# Patient Record
Sex: MALE | Race: WHITE | NOT HISPANIC OR LATINO | Employment: FULL TIME | URBAN - METROPOLITAN AREA
[De-identification: names, ages, dates, MRNs, and addresses within clinical notes are randomized per-mention and may not be internally consistent; named-entity substitution may affect disease eponyms.]

---

## 2019-06-25 ENCOUNTER — APPOINTMENT (OUTPATIENT)
Dept: MEDICAL GROUP | Facility: PHYSICIAN GROUP | Age: 64
End: 2019-06-25
Payer: MEDICARE

## 2019-06-25 ENCOUNTER — TELEPHONE (OUTPATIENT)
Dept: MEDICAL GROUP | Facility: PHYSICIAN GROUP | Age: 64
End: 2019-06-25

## 2019-06-25 NOTE — TELEPHONE ENCOUNTER
Future Appointments       Provider Department Center    6/26/2019 10:30 AM Lana Jerry D.O. [facility]        NEW PATIENT VISIT PRE-VISIT PLANNING    1.  E[employer] Patient is checked in Patient Demographics? YES    2.  Immunizations were updated in [facility] using WebIZ?: Yes       •  Web Iz Recommendations: FLU, PREVNAR (PCV13) , TD, VARICELLA (Chicken Pox)  and SHINGRIX (Shingles)    3.  Is this appointment scheduled as a Hospital Follow-Up? No    4.  Patient is due for the following Health Maintenance Topics:   Health Maintenance Due   Topic Date Due   • HEPATITIS C SCREENING  1955   • COLONOSCOPY  06/12/2004   • IMM ZOSTER VACCINES (1 of 2) 06/12/2004   • IMM PNEUMOCOCCAL  (1 of 2 - PCV13) 06/12/2019       5. Orders for overdue Health Maintenance topics pended in Pre-Charting? YES    6.  Reviewed/Updated the following with patient:   •   Preferred Pharmacy? YES       •   Preferred Lab? YES       •   Preferred Communication? YES       •   Allergies? YES       •   Medications? NO       •   Social History? YES. Was Abstract Encounter opened and chart updated? YES       •   Family History (document living status of immediate family members and if + hx of cancer, diabetes, hypertension, hyperlipidemia, heart attack, stroke) YES. Was Abstract Encounter opened and chart updated? YES    7.  Updated Care Team?       •   DME Company (gait device, O2, CPAP, etc.) YES       •   Other Specialists (eye doctor, derm, GYN, cardiology, endo, etc): YES    8.  Patient was informed to arrive 15 min prior to their   scheduled appointment and bring in their medication bottles.

## 2019-06-26 ENCOUNTER — OFFICE VISIT (OUTPATIENT)
Dept: MEDICAL GROUP | Facility: PHYSICIAN GROUP | Age: 64
End: 2019-06-26
Payer: MEDICARE

## 2019-06-26 ENCOUNTER — APPOINTMENT (OUTPATIENT)
Dept: RADIOLOGY | Facility: IMAGING CENTER | Age: 64
End: 2019-06-26
Attending: FAMILY MEDICINE
Payer: MEDICARE

## 2019-06-26 ENCOUNTER — TELEPHONE (OUTPATIENT)
Dept: MEDICAL GROUP | Facility: PHYSICIAN GROUP | Age: 64
End: 2019-06-26

## 2019-06-26 ENCOUNTER — HOSPITAL ENCOUNTER (OUTPATIENT)
Dept: LAB | Facility: MEDICAL CENTER | Age: 64
End: 2019-06-26
Attending: FAMILY MEDICINE
Payer: MEDICARE

## 2019-06-26 VITALS
WEIGHT: 179 LBS | HEART RATE: 70 BPM | DIASTOLIC BLOOD PRESSURE: 86 MMHG | TEMPERATURE: 98.3 F | BODY MASS INDEX: 23.72 KG/M2 | SYSTOLIC BLOOD PRESSURE: 126 MMHG | HEIGHT: 73 IN | OXYGEN SATURATION: 99 %

## 2019-06-26 DIAGNOSIS — M25.562 CHRONIC PAIN OF LEFT KNEE: ICD-10-CM

## 2019-06-26 DIAGNOSIS — F17.200 SMOKER: ICD-10-CM

## 2019-06-26 DIAGNOSIS — Z00.00 PREVENTATIVE HEALTH CARE: ICD-10-CM

## 2019-06-26 DIAGNOSIS — G89.29 CHRONIC PAIN OF LEFT KNEE: ICD-10-CM

## 2019-06-26 DIAGNOSIS — E78.49 OTHER HYPERLIPIDEMIA: ICD-10-CM

## 2019-06-26 DIAGNOSIS — Z11.59 NEED FOR HEPATITIS C SCREENING TEST: ICD-10-CM

## 2019-06-26 DIAGNOSIS — Z87.81 HISTORY OF FRACTURED KNEECAP: ICD-10-CM

## 2019-06-26 LAB
ALBUMIN SERPL BCP-MCNC: 4.2 G/DL (ref 3.2–4.9)
ALBUMIN/GLOB SERPL: 1.8 G/DL
ALP SERPL-CCNC: 44 U/L (ref 30–99)
ALT SERPL-CCNC: 9 U/L (ref 2–50)
ANION GAP SERPL CALC-SCNC: 9 MMOL/L (ref 0–11.9)
AST SERPL-CCNC: 13 U/L (ref 12–45)
BILIRUB SERPL-MCNC: 0.6 MG/DL (ref 0.1–1.5)
BUN SERPL-MCNC: 14 MG/DL (ref 8–22)
CALCIUM SERPL-MCNC: 9.7 MG/DL (ref 8.5–10.5)
CHLORIDE SERPL-SCNC: 107 MMOL/L (ref 96–112)
CHOLEST SERPL-MCNC: 188 MG/DL (ref 100–199)
CO2 SERPL-SCNC: 24 MMOL/L (ref 20–33)
CREAT SERPL-MCNC: 0.79 MG/DL (ref 0.5–1.4)
GLOBULIN SER CALC-MCNC: 2.4 G/DL (ref 1.9–3.5)
GLUCOSE SERPL-MCNC: 93 MG/DL (ref 65–99)
HCV AB SER QL: NEGATIVE
HDLC SERPL-MCNC: 45 MG/DL
LDLC SERPL CALC-MCNC: 116 MG/DL
POTASSIUM SERPL-SCNC: 3.9 MMOL/L (ref 3.6–5.5)
PROT SERPL-MCNC: 6.6 G/DL (ref 6–8.2)
SODIUM SERPL-SCNC: 140 MMOL/L (ref 135–145)
TRIGL SERPL-MCNC: 135 MG/DL (ref 0–149)

## 2019-06-26 PROCEDURE — 99204 OFFICE O/P NEW MOD 45 MIN: CPT | Performed by: FAMILY MEDICINE

## 2019-06-26 PROCEDURE — 80053 COMPREHEN METABOLIC PANEL: CPT

## 2019-06-26 PROCEDURE — 36415 COLL VENOUS BLD VENIPUNCTURE: CPT

## 2019-06-26 PROCEDURE — 73564 X-RAY EXAM KNEE 4 OR MORE: CPT | Mod: TC,LT | Performed by: FAMILY MEDICINE

## 2019-06-26 PROCEDURE — 86803 HEPATITIS C AB TEST: CPT

## 2019-06-26 PROCEDURE — 80061 LIPID PANEL: CPT

## 2019-06-26 RX ORDER — VARENICLINE TARTRATE 1 MG/1
1 TABLET, FILM COATED ORAL 2 TIMES DAILY
Qty: 60 TAB | Refills: 3 | Status: SHIPPED | OUTPATIENT
Start: 2019-06-26 | End: 2022-09-07

## 2019-06-26 ASSESSMENT — PATIENT HEALTH QUESTIONNAIRE - PHQ9: CLINICAL INTERPRETATION OF PHQ2 SCORE: 0

## 2019-06-26 NOTE — TELEPHONE ENCOUNTER
----- Message from Lana Jerry D.O. sent at 6/26/2019  1:57 PM PDT -----  Please call patient and let him know that his knee x-ray shows some fluid in the knee as well as arthritis.  As discussed during today's appointment, I do think he needs to get in with orthopedic physician which was referred at today's appointment.  Let me know if he has questions.

## 2019-06-26 NOTE — PROGRESS NOTES
"CC: Left knee pain    HISTORY OF THE PRESENT ILLNESS: Patient is a 64 y.o. male. This pleasant patient is here today to establish care and discuss health problems below.    Left knee pain: Patient has a history of knee fracture twice on the left side.  He has had surgery both times for these knee fractures.  He reports that a couple of weeks ago he began to have significant pain in his left knee which is restricting his range of motion.  Pain somewhat radiates down into the back part of the knee as well and into the upper calf.  He is noticed some mild swelling.  He is tried some ibuprofen occasionally but it has not really done anything to help the pain.  Other than his previous history of knee fractures, he has had no known recent injury.  The joint is not red and hot.  He has no calf redness, pain or swelling.    Smoker: Patient smokes about 1 pack/day.  He does note that he has smoked for about 45 years.  He denies shortness of breath or cough.  He is interested in quitting today.  He is requesting a prescription for Chantix.  He did successfully used Chantix in the past and reports no side effects from it.  He would like to try it again at this time.    Hyperlipidemia: She reports history of hyperlipidemia in the past.  He states that the doctor gave him the medication, and it made him \"bleed a lot\" so he stopped taking it.  He is open to repeat lab work today.    Allergies: Patient has no known allergies.    Current Outpatient Prescriptions Ordered in [facility]   Medication Sig Dispense Refill   • varenicline (CHANTIX STARTING MONTH NEGRO) 0.5 MG X 11 & 1 MG X 42 tablet Days 1 to 3: 0.5 mg once daily. Days 4 to 7: 0.5 mg twice daily. Days 8 and beyond 1 mg twice daily. 56 Tab 3   • varenicline (CHANTIX CONTINUING MONTH NEGRO) 1 MG tablet Take 1 Tab by mouth 2 times a day. 60 Tab 3     No current Epic-ordered facility-administered medications on file.        History reviewed. No pertinent past medical history.    Past " Surgical History:   Procedure Laterality Date   • OPEN REDUCTION      right knee fracture repair x2       Social History   Substance Use Topics   • Smoking status: Current Every Day Smoker     Packs/day: 1.00   • Smokeless tobacco: Current User     Types: Chew   • Alcohol use No       Social History     Social History Narrative   • No narrative on file       Family History   Problem Relation Age of Onset   • Heart Disease Mother    • Heart Disease Father    • No Known Problems Sister    • No Known Problems Brother    • No Known Problems Maternal Grandmother    • No Known Problems Maternal Grandfather    • No Known Problems Paternal Grandmother    • No Known Problems Paternal Grandfather    • No Known Problems Sister    • No Known Problems Sister    • No Known Problems Sister        ROS:     - Constitutional: Negative for fever, chills, unexpected weight change, and fatigue/generalized weakness.     - HEENT: Negative for headaches, vision changes, hearing changes, ear pain, ear discharge, rhinorrhea, sinus congestion, sore throat, and neck pain.      - Respiratory: Negative for cough, sputum production, chest congestion, dyspnea, wheezing, and crackles.      - Cardiovascular: Negative for chest pain, palpitations, orthopnea, PND, and bilateral lower extremity edema.     - Gastrointestinal: Negative for heartburn, nausea, vomiting, abdominal pain, hematochezia, melena, diarrhea, constipation, and greasy/foul-smelling stools.     - Genitourinary: Negative for dysuria, polyuria, hematuria, pyuria, urinary urgency, and urinary incontinence.     - Musculoskeletal:See HPI     - Skin: Negative for rash, itching, cyanotic skin color change.     - Neurological: Negative for dizziness, tingling, tremors, focal sensory deficit, focal weakness and headaches.     - Endo/Heme/Allergies: Does not bruise/bleed easily. No polyuria or polydipsia.    Exam: /86 (BP Location: Left arm, Patient Position: Sitting, BP Cuff Size:  "Adult)   Pulse 70   Temp 36.8 °C (98.3 °F) (Temporal)   Ht 1.854 m (6' 1\")   Wt 81.2 kg (179 lb)   SpO2 99%  Body mass index is 23.62 kg/m².    General: Well appearing, NAD  HEENT: Normocephalic. Conjunctiva clear, lids without ptosis, pupils equal and reactive to light accommodation, ears normal shape and contour, oropharynx is without erythema, edema or exudates.   Neck: Supple without JVD. No thyromegaly or nodules  Pulmonary: Clear to ausculation.  Normal effort. No rales, ronchi, or wheezing.  Cardiovascular: Regular rate and rhythm without murmur, rubs or gallop.   Abdomen: Soft, nontender, nondistended. Normal bowel sounds. Liver and spleen are not palpable. No rebound or guarding  Neurologic: Cranial nerves 2-12 intact grossly  Lymph: No cervical, supraclavicular lymph nodes are palpable  Skin: Warm and dry.  No obvious lesions.  Musculoskeletal: Left knee with inability to fully extend and flex.  No varus or valgus laxity.  Negative Lachman's.  Unable to perform Lou's due to limited range of motion.  No tenderness to palpation.  Knee is not erythematous or warm to touch.  Psych: Normal mood and affect. Alert and oriented. Judgment and insight is normal.    Please note that this dictation was created using voice recognition software. I have made every reasonable attempt to correct obvious errors, but I expect that there are errors of grammar and possibly content that I did not discover before finalizing the note.      Assessment/Plan  Venkat was seen today for leg pain.    Diagnoses and all orders for this visit:    Chronic pain of left knee  History of fractured kneecap  Acute on chronic issue for the patient.  X-ray today showed joint degenerative as well as small effusion.  Given his history of 2 knee surgeries due to knee fracture, we will go ahead and refer to orthopedics for further evaluation.  -     DX-KNEE 3 VIEWS LEFT; Future  -     REFERRAL TO ORTHOPEDICS    Smoker  Chronic uncontrolled " issue for the patient.  He is tolerated Chantix in the past.  Side effects of the medication were discussed as well as how to use it.  -     varenicline (CHANTIX STARTING MONTH PAK) 0.5 MG X 11 & 1 MG X 42 tablet; Days 1 to 3: 0.5 mg once daily. Days 4 to 7: 0.5 mg twice daily. Days 8 and beyond 1 mg twice daily.  -     varenicline (CHANTIX CONTINUING MONTH PAK) 1 MG tablet; Take 1 Tab by mouth 2 times a day.    Other hyperlipidemia  Previous issue, unclear whether or not controlled.  Checking lipid profile as below.  -     Lipid Profile; Future    Preventative health care  -     Lipid Profile; Future  -     Comp Metabolic Panel; Future    Need for hepatitis C screening test  -     HEP C VIRUS ANTIBODY; Future    Follow-up in about 1 month for labs.    Lana Jerry DO  [facility]

## 2019-06-26 NOTE — LETTER
[facility]  Lana Jerry D.O.  [address]  [place] [address]  Fax: [phone]   Authorization for Release/Disclosure of   Protected Health Information   Name: VENKAT ROBLEDO : 1955 SSN: [id number]   Address: [address] [place] [id number] Phone:    [phone] (home)    I authorize the entity listed below to release/disclose the PHI below to:   [facility]/Lana Jerry D.O. and Lana Jerry D.O.   Provider or Entity Name:  [facility]   Address   City, [facility]   Phone:      Fax: [phone]     Reason for request: continuity of care   Information to be released:    [XX  ] LAST COLONOSCOPY,  including any PATH REPORT and follow-up  [  ] LAST FIT/COLOGUARD RESULT [  ] LAST DEXA  [  ] LAST MAMMOGRAM  [  ] LAST PAP  [  ] LAST LABS [  ] RETINA EXAM REPORT  [  ] IMMUNIZATION RECORDS  [  ] Release all info      [  ] Check here and initial the line next to each item to release ALL health information INCLUDING  _____ Care and treatment for drug and / or alcohol abuse  _____ HIV testing, infection status, or AIDS    DATES OF SERVICE OR TIME PERIOD TO BE DISCLOSED: _____________  I understand and acknowledge that:  * This Authorization may be revoked at any time by you in writing, except if your health information has already been used or disclosed.  * Your health information that will be used or disclosed as a result of you signing this authorization could be re-disclosed by the recipient. If this occurs, your re-disclosed health information may no longer be protected by State or Federal laws.  * You may refuse to sign this Authorization. Your refusal will not affect your ability to obtain treatment.  * This Authorization becomes effective upon signing and will  on (date) __________.      If no date is indicated, this Authorization will  one (1) year from the signature date.    Name: Venkat Robledo    Signature:  continuity of care   Date:     6/26/2019       PLEASE FAX REQUESTED RECORDS BACK TO: [phone]

## 2019-06-28 ENCOUNTER — TELEPHONE (OUTPATIENT)
Dept: MEDICAL GROUP | Facility: PHYSICIAN GROUP | Age: 64
End: 2019-06-28

## 2019-07-10 ENCOUNTER — TELEPHONE (OUTPATIENT)
Dept: MEDICAL GROUP | Facility: PHYSICIAN GROUP | Age: 64
End: 2019-07-10

## 2019-07-10 NOTE — TELEPHONE ENCOUNTER
Future Appointments       Provider Department Center    7/17/2019 9:50 AM Lana Jerry D.O. [facility]        ESTABLISHED PATIENT PRE-VISIT PLANNING     Patient was NOT contacted to complete PVP.     Note: Patient will not be contacted if there is no indication to call.     1.  Reviewed notes from the last few office visits within the medical group: Yes    2.  If any orders were placed at last visit or intended to be done for this visit (i.e. 6 mos follow-up), do we have Results/Consult Notes?        •  Labs - Labs ordered, completed on 06/26/2019 and results are in chart.   Note: If patient appointment is for lab review and patient did not complete labs, check with provider if OK to reschedule patient until labs completed.       •  Imaging - Imaging ordered, completed and results are in chart.       •  Referrals - Referral ordered, patient has NOT been seen.    3. Is this appointment scheduled as a Hospital Follow-Up? No    4.  Immunizations were updated in [employer] using WebIZ?: Yes       •  Web Iz Recommendations: FLU, PREVNAR (PCV13) , TD, VARICELLA (Chicken Pox)  and SHINGRIX (Shingles)    5.  Patient is due for the following Health Maintenance Topics:   Health Maintenance Due   Topic Date Due   • Annual Wellness Visit  1955   • IMM ZOSTER VACCINES (1 of 2) 06/12/2004   • COLONOSCOPY  03/31/2013   • IMM PNEUMOCOCCAL 65+ (ADULT) LOW/MEDIUM RISK SERIES (1 of 2 - PCV13) 06/12/2019       6. Orders for overdue Health Maintenance topics pended in Pre-Charting? YES    7.  AHA (MDX) form printed for Provider? No, already completed    8.  Patient was informed to arrive 15 min prior to their scheduled appointment and bring in their medication bottles.

## 2019-07-10 NOTE — LETTER
[facility]  Lana Jerry D.O.  [address]  [place] [address]  Fax: [phone]   Authorization for Release/Disclosure of   Protected Health Information   Name: VENKAT EAST : 1955 SSN: [id number]   Address: [address] [place] [address] Phone:    [phone] (home)    I authorize the entity listed below to release/disclose the PHI below to:   [facility]/Lana Jerry D.O. and Lana Jerry D.O.   Provider or Entity Name:[facility]     Address   City, [facility]   Phone:      Fax:[phone]     Reason for request: continuity of care   Information to be released:    [  ] LAST COLONOSCOPY,  including any PATH REPORT and follow-up  [  ] LAST FIT/COLOGUARD RESULT [  ] LAST DEXA  [  ] LAST MAMMOGRAM  [  ] LAST PAP  [  ] LAST LABS [  ] RETINA EXAM REPORT  [  ] IMMUNIZATION RECORDS  [XX ] Release all info      [  ] Check here and initial the line next to each item to release ALL health information INCLUDING  _____ Care and treatment for drug and / or alcohol abuse  _____ HIV testing, infection status, or AIDS  _____ Genetic Testing    DATES OF SERVICE OR TIME PERIOD TO BE DISCLOSED: _____________  I understand and acknowledge that:  * This Authorization may be revoked at any time by you in writing, except if your health information has already been used or disclosed.  * Your health information that will be used or disclosed as a result of you signing this authorization could be re-disclosed by the recipient. If this occurs, your re-disclosed health information may no longer be protected by State or Federal laws.  * You may refuse to sign this Authorization. Your refusal will not affect your ability to obtain treatment.  * This Authorization becomes effective upon signing and will  on (date) __________.      If no date is indicated, this Authorization will  one (1) year from the signature date.    Name: Venkat Canchola  Venkat    Signature: Continuity of care    Date:     7/10/2019       PLEASE FAX REQUESTED RECORDS BACK TO: [phone]

## 2019-07-17 ENCOUNTER — OFFICE VISIT (OUTPATIENT)
Dept: MEDICAL GROUP | Facility: PHYSICIAN GROUP | Age: 64
End: 2019-07-17
Payer: MEDICARE

## 2019-07-17 VITALS
WEIGHT: 179 LBS | SYSTOLIC BLOOD PRESSURE: 122 MMHG | OXYGEN SATURATION: 98 % | BODY MASS INDEX: 23.72 KG/M2 | HEIGHT: 73 IN | HEART RATE: 64 BPM | DIASTOLIC BLOOD PRESSURE: 64 MMHG | TEMPERATURE: 97.9 F

## 2019-07-17 DIAGNOSIS — E78.49 OTHER HYPERLIPIDEMIA: ICD-10-CM

## 2019-07-17 DIAGNOSIS — Z12.11 SCREENING FOR COLORECTAL CANCER: ICD-10-CM

## 2019-07-17 DIAGNOSIS — F17.200 SMOKER: ICD-10-CM

## 2019-07-17 DIAGNOSIS — Z12.12 SCREENING FOR COLORECTAL CANCER: ICD-10-CM

## 2019-07-17 PROCEDURE — 99214 OFFICE O/P EST MOD 30 MIN: CPT | Performed by: FAMILY MEDICINE

## 2019-07-17 NOTE — PROGRESS NOTES
"CC: Hyperlipidemia    HISTORY OF THE PRESENT ILLNESS: Patient is a 64 y.o. male. This pleasant patient is here today for lab follow-up and smoking.     Hyperlipidemia: Recent labs showed LDL cholesterol of 116.  However, patient's ASCVD risk score is quite high.  At this time, he prefers not to go on a statin.  He states he wants to quit smoking first and focus on one thing at a time as below.  He states he tried a cholesterol medication in the past and it \"made him bleed easily\".    Smoking: This is an ongoing issue for ago.  He states that he feels slightly more tired on it, but also endorses that he is not drinking as much coffee as this is a trigger for him to smoke.  He states that he is cut back from over a pack per day to about 1/2 pack/day at this time in 2 weeks.  He is hoping to quit entirely in another 2 weeks.  He states he is been able to identify some triggers that make him smoke such as driving in the car coffee and has tried to become more aware of those triggers.    Allergies: Patient has no known allergies.    Current Outpatient Prescriptions Ordered in [facility]   Medication Sig Dispense Refill   • varenicline (CHANTIX CONTINUING MONTH PAK) 1 MG tablet Take 1 Tab by mouth 2 times a day. 60 Tab 3   • varenicline (CHANTIX STARTING MONTH NEGRO) 0.5 MG X 11 & 1 MG X 42 tablet Days 1 to 3: 0.5 mg once daily. Days 4 to 7: 0.5 mg twice daily. Days 8 and beyond 1 mg twice daily. 56 Tab 3     No current Epic-ordered facility-administered medications on file.        History reviewed. No pertinent past medical history.    Past Surgical History:   Procedure Laterality Date   • OPEN REDUCTION      right knee fracture repair x2       Social History   Substance Use Topics   • Smoking status: Current Every Day Smoker     Packs/day: 0.50   • Smokeless tobacco: Current User     Types: Chew   • Alcohol use No       Social History     Social History Narrative   • No narrative on file       Family History   Problem Relation " "Age of Onset   • Heart Disease Mother    • Heart Disease Father    • No Known Problems Sister    • No Known Problems Brother    • No Known Problems Maternal Grandmother    • No Known Problems Maternal Grandfather    • No Known Problems Paternal Grandmother    • No Known Problems Paternal Grandfather    • No Known Problems Sister    • No Known Problems Sister    • No Known Problems Sister        ROS:     - Constitutional: Positive for fatigue.  Negative for fever, chills, unexpected weight change, and generalized weakness.     - Respiratory: Negative for cough, sputum production, chest congestion, dyspnea, wheezing, and crackles.      - Cardiovascular: Negative for chest pain, palpitations, orthopnea, PND, and bilateral lower extremity edema.     Labs: Labs reviewed from June 26, 2019 and questions answered with patient.    Exam: /64 (BP Location: Left arm, Patient Position: Sitting, BP Cuff Size: Adult)   Pulse 64   Temp 36.6 °C (97.9 °F) (Temporal)   Ht 1.854 m (6' 1\")   Wt 81.2 kg (179 lb)   SpO2 98%  Body mass index is 23.62 kg/m².    General: Well appearing, NAD  Psych: Normal mood and affect. Alert and oriented. Judgment and insight is normal.      Please note that this dictation was created using voice recognition software. I have made every reasonable attempt to correct obvious errors, but I expect that there are errors of grammar and possibly content that I did not discover before finalizing the note.      Assessment/Plan  Venkat was seen today for results.    Diagnoses and all orders for this visit:    Smoker  Chronic uncontrolled problem for the patient.  We will continue Chantix as he has been able to cut down by about 50% on smoking.  He plans to attempt to quit altogether in the next 2 weeks or so.    Other hyperlipidemia  Chronic uncontrolled issue for the patient.  His LDL is only 116.  However, given his family history, smoking history his ASCVD risk score is quite high as below.  I did " recommend statin medication for the patient.  He declines at this time.  He plans to reconsider after he stopped smoking.    The 10-year ASCVD risk score (Ryanjenny APODACA Jr., et al., 2013) is: 16.4%      Screening for colorectal cancer  -     COLOGUARD (FIT DNA)      Follow-up in about 2 months.    Lana Jerry DO  [facility]

## 2020-06-12 ENCOUNTER — PATIENT OUTREACH (OUTPATIENT)
Dept: HEALTH INFORMATION MANAGEMENT | Facility: OTHER | Age: 65
End: 2020-06-12

## 2020-06-12 NOTE — PROGRESS NOTES
Called member to introduce myself as their SCP  and to wish a Happy Birthday.Number was disconnected, send contact letter.

## 2021-08-09 ENCOUNTER — PATIENT OUTREACH (OUTPATIENT)
Dept: HEALTH INFORMATION MANAGEMENT | Facility: OTHER | Age: 66
End: 2021-08-09

## 2022-02-18 ENCOUNTER — TELEPHONE (OUTPATIENT)
Dept: HEALTH INFORMATION MANAGEMENT | Facility: OTHER | Age: 67
End: 2022-02-18

## 2022-09-07 ENCOUNTER — OFFICE VISIT (OUTPATIENT)
Dept: URGENT CARE | Facility: PHYSICIAN GROUP | Age: 67
End: 2022-09-07
Payer: MEDICARE

## 2022-09-07 VITALS
HEART RATE: 71 BPM | DIASTOLIC BLOOD PRESSURE: 78 MMHG | OXYGEN SATURATION: 98 % | BODY MASS INDEX: 23.74 KG/M2 | RESPIRATION RATE: 20 BRPM | WEIGHT: 185 LBS | HEIGHT: 74 IN | SYSTOLIC BLOOD PRESSURE: 122 MMHG | TEMPERATURE: 98 F

## 2022-09-07 DIAGNOSIS — S60.551A FOREIGN BODY OF RIGHT HAND, INITIAL ENCOUNTER: ICD-10-CM

## 2022-09-07 DIAGNOSIS — L84 CORN OF FOOT: ICD-10-CM

## 2022-09-07 PROCEDURE — 99213 OFFICE O/P EST LOW 20 MIN: CPT | Mod: 25 | Performed by: FAMILY MEDICINE

## 2022-09-07 PROCEDURE — 10120 INC&RMVL FB SUBQ TISS SMPL: CPT | Performed by: FAMILY MEDICINE

## 2022-09-07 ASSESSMENT — ENCOUNTER SYMPTOMS
NAUSEA: 0
BRUISES/BLEEDS EASILY: 0
MYALGIAS: 0
WEIGHT LOSS: 0
EYE DISCHARGE: 0
VOMITING: 0
EYE REDNESS: 0

## 2022-09-07 NOTE — PROGRESS NOTES
"Subjective     Venkat MARCO A Robledo Sr is a 67 y.o. male who presents with Foreign Body in Skin (Right thumb- 1x day )            Onset today right thumb large wood splinter from a pallet.  He is unable to remove it at home.  No bleeding.  No function loss.  Tetanus is up-to-date    2.  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  Painful with walking.  He has \"dug at them\" at times with some relief.  No drainage.  No other aggravating or alleviating factors.      Review of Systems   Constitutional:  Negative for malaise/fatigue and weight loss.   Eyes:  Negative for discharge and redness.   Gastrointestinal:  Negative for nausea and vomiting.   Musculoskeletal:  Negative for joint pain and myalgias.   Skin:  Negative for itching and rash.   Endo/Heme/Allergies:  Does not bruise/bleed easily.            Objective     /78 (BP Location: Left arm, Patient Position: Sitting, BP Cuff Size: Adult)   Pulse 71   Temp 36.7 °C (98 °F) (Temporal)   Resp 20   Ht 1.88 m (6' 2\")   Wt 83.9 kg (185 lb)   SpO2 98%   BMI 23.75 kg/m²      Physical Exam  Constitutional:       Appearance: Normal appearance.   Musculoskeletal:        Hands:         Feet:    Neurological:      Mental Status: He is alert.           Procedure: Skin Foreign Body Removal   -Risks, benefits and alternatives discussed. Risks including bleeding, nerve damage, infection and poor cosmetic outcome  -Clean technique with sterile instruments  -Local anesthesia using plain lidocaine  -Small incision made at puncture wound with #15 blade.    Foreign body visualized with traction and grasped with a needle .  Foreign body pulled straight out with distal tip intact.  -Wound irrigated copiously with sterile saline  -No active bleeding after removal with minimal blood loss during procedure  -Patient tolerated well                 Assessment & Plan        1. Foreign body of right hand, initial encounter        2. [place] of foot  Referral to Podiatry        Differential " diagnosis, natural history, supportive care, and indications for immediate follow-up discussed at length.     Wound care.  Follow-up with podiatry for corns.

## 2023-05-03 ENCOUNTER — TELEPHONE (OUTPATIENT)
Dept: HEALTH INFORMATION MANAGEMENT | Facility: OTHER | Age: 68
End: 2023-05-03
Payer: MEDICARE

## 2023-06-12 ENCOUNTER — TELEPHONE (OUTPATIENT)
Dept: HEALTH INFORMATION MANAGEMENT | Facility: OTHER | Age: 68
End: 2023-06-12

## 2024-04-30 ENCOUNTER — APPOINTMENT (OUTPATIENT)
Dept: RADIOLOGY | Facility: MEDICAL CENTER | Age: 69
End: 2024-04-30
Attending: STUDENT IN AN ORGANIZED HEALTH CARE EDUCATION/TRAINING PROGRAM
Payer: MEDICARE

## 2024-04-30 ENCOUNTER — HOSPITAL ENCOUNTER (EMERGENCY)
Facility: MEDICAL CENTER | Age: 69
End: 2024-04-30
Attending: STUDENT IN AN ORGANIZED HEALTH CARE EDUCATION/TRAINING PROGRAM
Payer: MEDICARE

## 2024-04-30 ENCOUNTER — OFFICE VISIT (OUTPATIENT)
Dept: URGENT CARE | Facility: PHYSICIAN GROUP | Age: 69
End: 2024-04-30
Payer: MEDICARE

## 2024-04-30 VITALS
DIASTOLIC BLOOD PRESSURE: 74 MMHG | HEART RATE: 72 BPM | SYSTOLIC BLOOD PRESSURE: 136 MMHG | WEIGHT: 189 LBS | HEIGHT: 73 IN | BODY MASS INDEX: 25.05 KG/M2 | RESPIRATION RATE: 18 BRPM | TEMPERATURE: 98.4 F | OXYGEN SATURATION: 95 %

## 2024-04-30 VITALS
SYSTOLIC BLOOD PRESSURE: 158 MMHG | HEIGHT: 74 IN | HEART RATE: 80 BPM | TEMPERATURE: 98 F | DIASTOLIC BLOOD PRESSURE: 99 MMHG | RESPIRATION RATE: 18 BRPM | BODY MASS INDEX: 24.56 KG/M2 | WEIGHT: 191.36 LBS | OXYGEN SATURATION: 97 %

## 2024-04-30 DIAGNOSIS — I80.9 THROMBOPHLEBITIS: ICD-10-CM

## 2024-04-30 DIAGNOSIS — M79.89 LEG SWELLING: ICD-10-CM

## 2024-04-30 DIAGNOSIS — I80.02 THROMBOPHLEBITIS OF SUPERFICIAL VEINS OF LEFT LOWER EXTREMITY: ICD-10-CM

## 2024-04-30 RX ORDER — NAPROXEN 375 MG/1
375 TABLET ORAL 2 TIMES DAILY WITH MEALS
Qty: 14 TABLET | Refills: 0 | Status: SHIPPED | OUTPATIENT
Start: 2024-04-30 | End: 2024-05-07

## 2024-04-30 ASSESSMENT — ENCOUNTER SYMPTOMS
FEVER: 0
FALLS: 0
TINGLING: 0
DIZZINESS: 0
PALPITATIONS: 0
VOMITING: 0
SENSORY CHANGE: 0
SHORTNESS OF BREATH: 0
WHEEZING: 0
CHILLS: 0
NECK PAIN: 0
NAUSEA: 0
DIARRHEA: 0
SPUTUM PRODUCTION: 0
WEAKNESS: 0
BACK PAIN: 0
ABDOMINAL PAIN: 0
COUGH: 0
STRIDOR: 0
MYALGIAS: 0

## 2024-04-30 ASSESSMENT — VISUAL ACUITY: OU: 1

## 2024-05-01 NOTE — PROGRESS NOTES
Subjective     Venkat MARCO A Robledo Sr is a 68 y.o. male who presents with left leg swelling and pain x2 days.     HPI:   Venkat is a 67yo male presenting for left leg swelling and pain x2 days. Pain is located on his posterior calf and reports an associated swollen vein with lumps and redness. He is a current smoker and has received previous left knee surgery. Denies previous DVT/PE. He is not on blood thinners. Denies numbness/tingling or sensation loss in extremity. No fever or shortness of breath. LLE ROM is intact. No open wounds or lesions. He is able to ambulate without difficulty.      Review of Systems   Constitutional:  Negative for chills, fever and malaise/fatigue.   Respiratory:  Negative for cough, sputum production, shortness of breath, wheezing and stridor.    Cardiovascular:  Positive for leg swelling. Negative for chest pain and palpitations.   Gastrointestinal:  Negative for abdominal pain, diarrhea, nausea and vomiting.   Musculoskeletal:  Negative for back pain, falls, joint pain, myalgias and neck pain.   Skin:  Negative for itching and rash.   Neurological:  Negative for dizziness, tingling, sensory change and weakness.     History reviewed. No pertinent past medical history.     Past Surgical History:   Procedure Laterality Date    OPEN REDUCTION      right knee fracture repair x2      Patient has no known allergies.     Current Outpatient Medications:     naproxen (NAPROSYN) 375 MG Tab, Take 1 Tablet by mouth 2 times a day with meals for 7 days., Disp: 14 Tablet, Rfl: 0     Social History     Tobacco Use    Smoking status: Every Day     Current packs/day: 0.50     Types: Cigarettes    Smokeless tobacco: Current     Types: Chew   Vaping Use    Vaping Use: Never used   Substance Use Topics    Alcohol use: No    Drug use: No      Family History   Problem Relation Age of Onset    Heart Disease Mother     Heart Disease Father     No Known Problems Sister     No Known Problems Brother     No Known  "Problems Maternal Grandmother     No Known Problems Maternal Grandfather     No Known Problems Paternal Grandmother     No Known Problems Paternal Grandfather     No Known Problems Sister     No Known Problems Sister     No Known Problems Sister       Medications, Allergies, and current problem list reviewed today in Epic.      Objective     /74 (BP Location: Right arm, Patient Position: Sitting, BP Cuff Size: Adult)   Pulse 72   Temp 36.9 °C (98.4 °F) (Temporal)   Resp 18   Ht 1.854 m (6' 1\")   Wt 85.7 kg (189 lb)   SpO2 95%   BMI 24.94 kg/m²      Physical Exam  Vitals reviewed.   Constitutional:       General: He is not in acute distress.  HENT:      Nose: Nose normal.   Eyes:      General: Vision grossly intact. Gaze aligned appropriately.      Extraocular Movements: Extraocular movements intact.      Conjunctiva/sclera: Conjunctivae normal.      Pupils: Pupils are equal, round, and reactive to light.   Cardiovascular:      Rate and Rhythm: Normal rate and regular rhythm.      Pulses: Normal pulses.           Popliteal pulses are 2+ on the right side and 2+ on the left side.        Dorsalis pedis pulses are 2+ on the right side and 2+ on the left side.        Posterior tibial pulses are 2+ on the right side and 2+ on the left side.      Heart sounds: Normal heart sounds.   Pulmonary:      Effort: Pulmonary effort is normal. No tachypnea, accessory muscle usage, prolonged expiration, respiratory distress or retractions.      Breath sounds: Normal breath sounds.   Musculoskeletal:      Cervical back: Full passive range of motion without pain, normal range of motion and neck supple. Normal range of motion.      Right knee: Normal.      Left knee: No swelling, deformity, effusion, erythema, ecchymosis, lacerations, bony tenderness or crepitus. Normal range of motion. No tenderness. Normal alignment, normal meniscus and normal patellar mobility.      Left lower leg: Swelling and tenderness present. No " deformity, lacerations or bony tenderness. Edema present.      Right ankle: Normal.      Right Achilles Tendon: Normal.      Left ankle: No swelling, deformity, ecchymosis or lacerations. No tenderness. Normal range of motion. Anterior drawer test negative. Normal pulse.      Left Achilles Tendon: No tenderness or defects. Winkler's test negative.      Right foot: Normal.      Left foot: Normal range of motion and normal capillary refill. No swelling, deformity, foot drop, laceration, tenderness, bony tenderness or crepitus. Normal pulse.      Comments: Left calf swelling and tenderness to palpation. Distal neurovascular intact.    Skin:     General: Skin is warm and dry.      Capillary Refill: Capillary refill takes less than 2 seconds.      Comments: No wounds    Neurological:      Mental Status: He is alert. Mental status is at baseline.   Psychiatric:         Mood and Affect: Mood normal.         Behavior: Behavior normal.         Thought Content: Thought content normal.       Assessment & Plan     1. Leg swelling     2. Thrombophlebitis       MDM/Comments:   Patient with left calf swelling and pain. Unable to rule out DVT at this time, warranting transfer to a higher level of care for further evaluation.   Patient verbalizes understanding and is in agreement with the plan of care.     Differential diagnosis, natural history, supportive care, and indications for immediate follow-up discussed.        Disposition:     Higher level care via private car in stable condition                 Electronically signed by JANE Baker

## 2024-05-01 NOTE — ED PROVIDER NOTES
"ED Provider Note    CHIEF COMPLAINT  Chief Complaint   Patient presents with    Leg Swelling     Posterior left lower leg \"bumps\". Patient sent from  for Ultrasound to rule out DVT.        EXTERNAL RECORDS REVIEWED  Outpatient Notes office visit on 7/17/2019 for smoking history    HPI/ROS  LIMITATION TO HISTORY   Select: : None  OUTSIDE HISTORIAN(S):    Venkat MARCO A Robledo Sr is a 68 y.o. male who presents with swelling to the left lower extremity.  Patient has a history of's smoking and left knee injury.  Patient denies history of blood clots.  Patient denies weakness or numbness of the left lower extremity distally.  Patient denies chest pain or shortness of breath.    PAST MEDICAL HISTORY       SURGICAL HISTORY   has a past surgical history that includes open reduction.    FAMILY HISTORY  Family History   Problem Relation Age of Onset    Heart Disease Mother     Heart Disease Father     No Known Problems Sister     No Known Problems Brother     No Known Problems Maternal Grandmother     No Known Problems Maternal Grandfather     No Known Problems Paternal Grandmother     No Known Problems Paternal Grandfather     No Known Problems Sister     No Known Problems Sister     No Known Problems Sister        SOCIAL HISTORY  Social History     Tobacco Use    Smoking status: Every Day     Current packs/day: 0.50     Types: Cigarettes    Smokeless tobacco: Current     Types: Chew   Vaping Use    Vaping Use: Never used   Substance and Sexual Activity    Alcohol use: No    Drug use: No    Sexual activity: Not on file       CURRENT MEDICATIONS  Home Medications       Reviewed by Bubba Pritchard R.N. (Registered Nurse) on 04/30/24 at 1850  Med List Status: Not Addressed     Medication Last Dose Status        Patient Pipe Taking any Medications                           ALLERGIES  No Known Allergies    PHYSICAL EXAM  VITAL SIGNS: BP (!) 158/99   Pulse 80   Temp 36.7 °C (98 °F) (Temporal)   Resp 18   Ht 1.88 m (6' 2\")   Wt " 86.8 kg (191 lb 5.8 oz)   SpO2 97%   BMI 24.57 kg/m²    Vitals and nursing note reviewed.   Constitutional:       Comments: Patient is lying in bed supine, pleasant, conversant, speaking in complete sentences   HENT:      Head: Normocephalic and atraumatic.   Eyes:      Extraocular Movements: Extraocular movements intact.      Conjunctiva/sclera: Conjunctivae normal.      Pupils: Pupils are equal, round, and reactive to light.   Cardiovascular:      Pulses: Normal pulses.      Comments: HR 80  Pulmonary:      Effort: Pulmonary effort is normal. No respiratory distress.   Musculoskeletal:      Patient has palpable thrombophlebitis on the left calf, minimal tenderness, Distal affected extremity demonstrates intact sensation, motor, cap refill less than 2 seconds and 2+ pulses, warm and well perfused, no signs of tendon rupture, no crepitus on palpation.     Cervical back: Normal range of motion. No rigidity.   Skin:     General: Skin is warm and dry.      Capillary Refill: Capillary refill takes less than 2 seconds.   Neurological:      Mental Status: Alert.       EKG/LABS    RADIOLOGY/PROCEDURES   I have independently interpreted the diagnostic imaging associated with this visit and am waiting the final reading from the radiologist.   My preliminary interpretation is as follows: No DVT    Radiologist interpretation:  US-EXTREMITY VENOUS LOWER UNILAT LEFT   Final Result         1.  Superficial thrombophlebitis in the lesser saphenous vein   2.  No DVT identified          COURSE & MEDICAL DECISION MAKING    ASSESSMENT, COURSE AND PLAN  Care Narrative: Patient has risk factors for DVT including superficial thrombophlebitis, smoking, chronic left knee injury.  No evidence of neurovascular compromise.  No evidence of infection, no erythema, fluctuance, crepitus.  Disposition pending ultrasound.    Electronically signed by: Galdino Sánchez M.D., 4/30/2024 8:10 PM    No DVT, patient given follow-up with primary  "care, counseled to elevate leg and, follow-up with primary care in 7 to 10 days and take nonsteroidal anti-inflammatory medication.  Patient counseled on smoking cessation.    Repeat physical exam benign.  I doubt any serious emergency process at this time.  Patient and/or family, friends given strict return precautions for worsening symptoms and care instructions. They have demonstrated understanding of discharge instructions through teach back mechanism. Advised PCP follow-up in 1-2 days.  Patient/family/friend expresses understanding and agrees to plan.    This dictation has been created using voice recognition software. I am continuously working with the software to minimize the number of voice recognition errors and I have made every attempt to manually correct the errors within my dictation. However errors  related to this voice recognition software may still exist and should be interpreted within the appropriate context.     Electronically signed by: Galdino Sánchez M.D., 4/30/2024 8:54 PM    Procedure Note:  Tobacco Cessation Counselling, Intermediate Timeframe, Greater than 4 minutes, less than 10 minutes:    Patient has been smoking for 40 years, averaging 0.25 packs per day.  Has tried to stop in the past.  The patient knows that smoking is bad for general health and for the patient's disease process in particular.  I have recommended the use of a \"quit daniel\" to facilitate success.  I have also talked about resources with the [employer], the American Heart Association, as well as 1-800-QUIT-NOW.  Finally, I briefly mentioned that pharmacologic adjuncts might be prescribed by a primary care physician, failing over-the-counter modalities.  The patient is given aftercare instructions on tobacco cessation.        DISPOSITION AND DISCUSSIONS      Escalation of care considered, and ultimately not performed:Laboratory analysis    Barriers to care at this time, including but not limited to: " Patient does not have established PCP.     Decision tools and prescription drugs considered including, but not limited to: Pain Medications   over-the-counter pain medications are appropriate, narcotics not indicated at this time  .    FINAL DIAGNOSIS  1. Thrombophlebitis of superficial veins of left lower extremity           Electronically signed by: Galdino Sánchez M.D., 4/30/2024 8:06 PM

## 2024-05-28 ENCOUNTER — TELEPHONE (OUTPATIENT)
Dept: HEALTH INFORMATION MANAGEMENT | Facility: OTHER | Age: 69
End: 2024-05-28
Payer: MEDICARE

## 2024-08-13 ENCOUNTER — APPOINTMENT (OUTPATIENT)
Dept: MEDICAL GROUP | Facility: PHYSICIAN GROUP | Age: 69
End: 2024-08-13
Payer: MEDICARE

## 2024-08-13 VITALS
HEART RATE: 65 BPM | TEMPERATURE: 98.2 F | RESPIRATION RATE: 16 BRPM | DIASTOLIC BLOOD PRESSURE: 74 MMHG | SYSTOLIC BLOOD PRESSURE: 112 MMHG | OXYGEN SATURATION: 98 % | BODY MASS INDEX: 23.04 KG/M2 | HEIGHT: 74 IN | WEIGHT: 179.5 LBS

## 2024-08-13 DIAGNOSIS — I83.892 VARICOSE VEINS OF LEFT LOWER EXTREMITY WITH OTHER COMPLICATIONS: ICD-10-CM

## 2024-08-13 DIAGNOSIS — E78.49 OTHER HYPERLIPIDEMIA: ICD-10-CM

## 2024-08-13 DIAGNOSIS — F17.210 CIGARETTE SMOKER: ICD-10-CM

## 2024-08-13 DIAGNOSIS — R53.83 FATIGUE, UNSPECIFIED TYPE: ICD-10-CM

## 2024-08-13 DIAGNOSIS — S50.812A: ICD-10-CM

## 2024-08-13 DIAGNOSIS — Z23 NEED FOR VACCINATION: ICD-10-CM

## 2024-08-13 DIAGNOSIS — Z12.5 ENCOUNTER FOR SCREENING FOR MALIGNANT NEOPLASM OF PROSTATE: ICD-10-CM

## 2024-08-13 DIAGNOSIS — L98.9 SKIN LESION: ICD-10-CM

## 2024-08-13 DIAGNOSIS — Z12.11 COLON CANCER SCREENING: ICD-10-CM

## 2024-08-13 PROCEDURE — 3078F DIAST BP <80 MM HG: CPT | Performed by: PHYSICIAN ASSISTANT

## 2024-08-13 PROCEDURE — 3074F SYST BP LT 130 MM HG: CPT | Performed by: PHYSICIAN ASSISTANT

## 2024-08-13 PROCEDURE — 90472 IMMUNIZATION ADMIN EACH ADD: CPT | Performed by: PHYSICIAN ASSISTANT

## 2024-08-13 PROCEDURE — 90677 PCV20 VACCINE IM: CPT | Performed by: PHYSICIAN ASSISTANT

## 2024-08-13 PROCEDURE — G0009 ADMIN PNEUMOCOCCAL VACCINE: HCPCS | Performed by: PHYSICIAN ASSISTANT

## 2024-08-13 PROCEDURE — 99214 OFFICE O/P EST MOD 30 MIN: CPT | Mod: 25 | Performed by: PHYSICIAN ASSISTANT

## 2024-08-13 PROCEDURE — 90715 TDAP VACCINE 7 YRS/> IM: CPT | Performed by: PHYSICIAN ASSISTANT

## 2024-08-13 ASSESSMENT — ENCOUNTER SYMPTOMS
FEVER: 0
SHORTNESS OF BREATH: 0
CHILLS: 0

## 2024-08-13 ASSESSMENT — PATIENT HEALTH QUESTIONNAIRE - PHQ9: CLINICAL INTERPRETATION OF PHQ2 SCORE: 0

## 2024-08-13 NOTE — PROGRESS NOTES
SUBJECTIVE:     CC: establish care; prior UC    HPI:   Venkat presents today with the following:    ASSESSMENT & PLAN by Problem:       Problem List Items Addressed This Visit       Cigarette smoker     Chronic, uncontrolled.  Patient started smoking when he was 15.  He has been smoking a pack a day since then.  Patient also chews tobacco most days, going through a can every 2-3 weeks.  He is not ready to quit.  Ordering AAA for screening.           Relevant Orders    US-ABDOMINAL AORTA SCREENING (AAA)    Other hyperlipidemia     Chronic, uncontrolled.  Due to repeat lipid panel.         Relevant Orders    Lipid Profile    Skin lesion     Chronic, uncontrolled.  Patient describes a lesion on his back that he states has been there for a week but the drainage that comes from it is white, shiny, and thick.  Does not appear to be infected.  Follow-up for worsening symptoms.         Varicose veins of left lower extremity with other complications     Chronic, stable.  Patient has seen vascular around July 2024.  Was told to wear compression for 3 months then follow-up.  Patient does wear compression most days.          Other Visit Diagnoses       Fatigue, unspecified type        Relevant Orders    CBC WITH DIFFERENTIAL    Comp Metabolic Panel    TSH WITH REFLEX TO FT4    Encounter for screening for malignant neoplasm of prostate        Relevant Orders    PROSTATE SPECIFIC AG SCREENING    Need for vaccination        Relevant Orders    Pneumococcal Conjugate Vaccine 20-Valent (6 wks+) (Completed)    Tdap =>8yo IM (Completed)    Colon cancer screening        Relevant Orders    Referral to GI for Colonoscopy    Scratch of left forearm, initial encounter        Relevant Orders    Tdap =>8yo IM (Completed)            Pneumonia vaccine: given today    Tdap: given today             AAA screen: ordered today    Colon cancer screen: ordered today    Have labs drawn.    Return for lab discussion.        Healthcare  "Maintenance:      HPI:     Problem   Skin Lesion    Chronic, uncontrolled.  Patient describes a lesion on his back that he states has been there for a week but the drainage that comes from it is white, shiny, and thick.  Does not appear to be infected.  Follow-up for worsening symptoms.     Varicose Veins of Left Lower Extremity With Other Complications    Chronic, stable.  Patient has seen vascular around July 2024.  Was told to wear compression for 3 months then follow-up.  Patient does wear compression most days.     Cigarette Smoker    Chronic, uncontrolled.  Patient started smoking when he was 15.  He has been smoking a pack a day since then.  Patient also chews tobacco most days, going through a can every 2-3 weeks.  He is not ready to quit.       Other Hyperlipidemia    Chronic, uncontrolled.    Latest Labs:   Lab Results   Component Value Date/Time    CHOLSTRLTOT 188 06/26/2019 12:15 PM     (H) 06/26/2019 12:15 PM    HDL 45 06/26/2019 12:15 PM    TRIGLYCERIDE 135 06/26/2019 12:15 PM      Medications: none    Risk calculator: The ASCVD Risk score (Spencer CASTELLON, et al., 2019) failed to calculate.                   ROS:  Review of Systems   Constitutional:  Negative for chills and fever.   Respiratory:  Negative for shortness of breath.    Cardiovascular:  Negative for chest pain.       OBJECTIVE:     Exam:  /74 (BP Location: Left arm, Patient Position: Sitting, BP Cuff Size: Adult)   Pulse 65   Temp 36.8 °C (98.2 °F) (Temporal)   Resp 16   Ht 1.88 m (6' 2\")   Wt 81.4 kg (179 lb 8 oz)   SpO2 98%   BMI 23.05 kg/m²  Body mass index is 23.05 kg/m².    Physical Exam  Vitals reviewed.   Constitutional:       General: He is not in acute distress.     Appearance: Normal appearance.   Pulmonary:      Effort: Pulmonary effort is normal.   Musculoskeletal:      Comments: Scratch on left forearm. No signs of infection noted.   Neurological:      General: No focal deficit present.      Mental Status: He is " alert.   Psychiatric:         Mood and Affect: Mood normal.         Behavior: Behavior normal.         Judgment: Judgment normal.           Please note that this dictation was created using voice recognition software. I have made every reasonable attempt to correct obvious errors, but I expect that there are errors of grammar and possibly content that I did not discover before finalizing the note.

## 2024-08-13 NOTE — ASSESSMENT & PLAN NOTE
Chronic, uncontrolled.  Patient describes a lesion on his back that he states has been there for a week but the drainage that comes from it is white, shiny, and thick.  Does not appear to be infected.  Follow-up for worsening symptoms.

## 2024-08-13 NOTE — ASSESSMENT & PLAN NOTE
Chronic, uncontrolled.  Patient started smoking when he was 15.  He has been smoking a pack a day since then.  Patient also chews tobacco most days, going through a can every 2-3 weeks.  He is not ready to quit.  Ordering AAA for screening.

## 2024-08-13 NOTE — ASSESSMENT & PLAN NOTE
Chronic, stable.  Patient has seen vascular around July 2024.  Was told to wear compression for 3 months then follow-up.  Patient does wear compression most days.

## 2024-09-11 ENCOUNTER — HOSPITAL ENCOUNTER (OUTPATIENT)
Dept: LAB | Facility: MEDICAL CENTER | Age: 69
End: 2024-09-11
Attending: PHYSICIAN ASSISTANT
Payer: MEDICARE

## 2024-09-11 DIAGNOSIS — R53.83 FATIGUE, UNSPECIFIED TYPE: ICD-10-CM

## 2024-09-11 DIAGNOSIS — Z12.5 ENCOUNTER FOR SCREENING FOR MALIGNANT NEOPLASM OF PROSTATE: ICD-10-CM

## 2024-09-11 DIAGNOSIS — E78.49 OTHER HYPERLIPIDEMIA: ICD-10-CM

## 2024-09-11 LAB
ALBUMIN SERPL BCP-MCNC: 4.1 G/DL (ref 3.2–4.9)
ALBUMIN/GLOB SERPL: 1.6 G/DL
ALP SERPL-CCNC: 51 U/L (ref 30–99)
ALT SERPL-CCNC: 7 U/L (ref 2–50)
ANION GAP SERPL CALC-SCNC: 12 MMOL/L (ref 7–16)
AST SERPL-CCNC: 11 U/L (ref 12–45)
BASOPHILS # BLD AUTO: 0.9 % (ref 0–1.8)
BASOPHILS # BLD: 0.07 K/UL (ref 0–0.12)
BILIRUB SERPL-MCNC: 0.3 MG/DL (ref 0.1–1.5)
BUN SERPL-MCNC: 15 MG/DL (ref 8–22)
CALCIUM ALBUM COR SERPL-MCNC: 8.9 MG/DL (ref 8.5–10.5)
CALCIUM SERPL-MCNC: 9 MG/DL (ref 8.5–10.5)
CHLORIDE SERPL-SCNC: 106 MMOL/L (ref 96–112)
CHOLEST SERPL-MCNC: 206 MG/DL (ref 100–199)
CO2 SERPL-SCNC: 21 MMOL/L (ref 20–33)
CREAT SERPL-MCNC: 1 MG/DL (ref 0.5–1.4)
EOSINOPHIL # BLD AUTO: 0.33 K/UL (ref 0–0.51)
EOSINOPHIL NFR BLD: 4.2 % (ref 0–6.9)
ERYTHROCYTE [DISTWIDTH] IN BLOOD BY AUTOMATED COUNT: 53.2 FL (ref 35.9–50)
FASTING STATUS PATIENT QL REPORTED: NORMAL
GFR SERPLBLD CREATININE-BSD FMLA CKD-EPI: 81 ML/MIN/1.73 M 2
GLOBULIN SER CALC-MCNC: 2.5 G/DL (ref 1.9–3.5)
GLUCOSE SERPL-MCNC: 95 MG/DL (ref 65–99)
HCT VFR BLD AUTO: 40.9 % (ref 42–52)
HDLC SERPL-MCNC: 48 MG/DL
HGB BLD-MCNC: 13.7 G/DL (ref 14–18)
IMM GRANULOCYTES # BLD AUTO: 0.02 K/UL (ref 0–0.11)
IMM GRANULOCYTES NFR BLD AUTO: 0.3 % (ref 0–0.9)
LDLC SERPL CALC-MCNC: 144 MG/DL
LYMPHOCYTES # BLD AUTO: 2.13 K/UL (ref 1–4.8)
LYMPHOCYTES NFR BLD: 27 % (ref 22–41)
MCH RBC QN AUTO: 33.3 PG (ref 27–33)
MCHC RBC AUTO-ENTMCNC: 33.5 G/DL (ref 32.3–36.5)
MCV RBC AUTO: 99.3 FL (ref 81.4–97.8)
MONOCYTES # BLD AUTO: 0.53 K/UL (ref 0–0.85)
MONOCYTES NFR BLD AUTO: 6.7 % (ref 0–13.4)
NEUTROPHILS # BLD AUTO: 4.8 K/UL (ref 1.82–7.42)
NEUTROPHILS NFR BLD: 60.9 % (ref 44–72)
NRBC # BLD AUTO: 0 K/UL
NRBC BLD-RTO: 0 /100 WBC (ref 0–0.2)
PLATELET # BLD AUTO: 197 K/UL (ref 164–446)
PMV BLD AUTO: 11 FL (ref 9–12.9)
POTASSIUM SERPL-SCNC: 4.4 MMOL/L (ref 3.6–5.5)
PROT SERPL-MCNC: 6.6 G/DL (ref 6–8.2)
PSA SERPL-MCNC: 0.69 NG/ML (ref 0–4)
RBC # BLD AUTO: 4.12 M/UL (ref 4.7–6.1)
SODIUM SERPL-SCNC: 139 MMOL/L (ref 135–145)
T4 FREE SERPL-MCNC: 1 NG/DL (ref 0.93–1.7)
TRIGL SERPL-MCNC: 72 MG/DL (ref 0–149)
TSH SERPL DL<=0.005 MIU/L-ACNC: 9.96 UIU/ML (ref 0.38–5.33)
WBC # BLD AUTO: 7.9 K/UL (ref 4.8–10.8)

## 2024-09-11 PROCEDURE — 80061 LIPID PANEL: CPT

## 2024-09-11 PROCEDURE — 84443 ASSAY THYROID STIM HORMONE: CPT

## 2024-09-11 PROCEDURE — 84153 ASSAY OF PSA TOTAL: CPT

## 2024-09-11 PROCEDURE — 80053 COMPREHEN METABOLIC PANEL: CPT

## 2024-09-11 PROCEDURE — 84439 ASSAY OF FREE THYROXINE: CPT

## 2024-09-11 PROCEDURE — 85025 COMPLETE CBC W/AUTO DIFF WBC: CPT

## 2024-09-11 PROCEDURE — 36415 COLL VENOUS BLD VENIPUNCTURE: CPT

## 2024-09-12 DIAGNOSIS — D64.9 ANEMIA, UNSPECIFIED TYPE: ICD-10-CM

## 2024-09-16 ENCOUNTER — HOSPITAL ENCOUNTER (OUTPATIENT)
Dept: LAB | Facility: MEDICAL CENTER | Age: 69
End: 2024-09-16
Attending: PHYSICIAN ASSISTANT
Payer: MEDICARE

## 2024-09-16 DIAGNOSIS — D64.9 ANEMIA, UNSPECIFIED TYPE: ICD-10-CM

## 2024-09-16 LAB
FERRITIN SERPL-MCNC: 58.1 NG/ML (ref 22–322)
FOLATE SERPL-MCNC: 6.2 NG/ML
IRON SATN MFR SERPL: 31 % (ref 15–55)
IRON SERPL-MCNC: 73 UG/DL (ref 50–180)
TIBC SERPL-MCNC: 232 UG/DL (ref 250–450)
UIBC SERPL-MCNC: 159 UG/DL (ref 110–370)
VIT B12 SERPL-MCNC: 342 PG/ML (ref 211–911)

## 2024-09-16 PROCEDURE — 82746 ASSAY OF FOLIC ACID SERUM: CPT

## 2024-09-16 PROCEDURE — 83540 ASSAY OF IRON: CPT

## 2024-09-16 PROCEDURE — 82607 VITAMIN B-12: CPT

## 2024-09-16 PROCEDURE — 83550 IRON BINDING TEST: CPT

## 2024-09-16 PROCEDURE — 36415 COLL VENOUS BLD VENIPUNCTURE: CPT

## 2024-09-16 PROCEDURE — 82728 ASSAY OF FERRITIN: CPT
